# Patient Record
(demographics unavailable — no encounter records)

---

## 2024-11-07 NOTE — HISTORY OF PRESENT ILLNESS
[FreeTextEntry1] : 71 y/o male with Bladder stones and Recurrent UTI, here today for follow up with UTI. c/o dysuria, weak stream, Nocturia x1-2 denies hematuria and flank pain Hx of kidney stones years ago. Pending surgery. Eating citrus  FHx of PCA: denies Tobacco use: never smoke Last PSA: 4.30 ng/mL (10/03/24) Last creatinine: 1.36 mg/dL (10/03/24) Last UCx: POSITIVE (10/21/24) - treated with Ampicillin on 10/28/2024 Uro Meds: Tamsulosin 0.4 mg, Ampicillin  PSA Hx: 4.30 ng/mL (10/03/24) 4.81 ng/mL (06/07/24) - treated with Abx 7.03 ng/mL (02/21/24) 5.92 ng/mL (02/19/24)  03/20/2024 - CT Renal Stone hunt KIDNEYS/URETERS: Small right renal cysts. No right renal calculi or hydronephrosis. Numerous large left renal cortical and parapelvic cysts measuring up to 10.6 cm in the mid kidney. Nonobstructing 1.3 cm left lower pole renal and 3 mm mid renal calculi. No left ureteral calculi or hydronephrosis. BLADDER: There are 2 large bladder calculi measuring up to 1.3 cm. REPRODUCTIVE ORGANS: Prostate is not enlarged.  03/20/24 - MRI Prostate No MRI targetable lesions. PIRADS 2 - Low (clinically significant cancer is unlikely to be present) Inflammatory type enhancement in the posterior peripheral zone. Elevated PSA density; consider short interval follow-up MRI versus systematic biopsy. Prostate Volume: 30 mL PSA density: 0.24 ng/mL/mL  10/03/2024 - I discussed with the patient his condition and I explained that his weak urinary flow could be easily correlated to the bladder stones found on CT scan. I explained that the stones could be removed with an endoscopic laser treatment in OP setting. The patient agrees with the proposed strategy and is booked for surgery.

## 2025-01-13 NOTE — HISTORY OF PRESENT ILLNESS
[FreeTextEntry1] : 69 y/o male here today for follow up. Previously booked for URS c/o dysuria, weak stream, Nocturia x1-2 denies hematuria and flank pain Hx of kidney stones years ago Eating citrus  FHx of PCA: denies Tobacco use: never smoke Last PSA: 4.81 ng/mL (06/07/24) Last creatinine: 1.22 mg/dL (03/08/24) Last UCx: POSITIVE (03/07/24) - treated with Ampicillin Uro Meds: Tamsulosin 0.4 mg  PSA Hx: 4.81 ng/mL (06/07/24) - treated with Abx 7.03 ng/mL (02/21/24) 5.92 ng/mL (02/19/24)  03/20/2024 - CT Renal Stone hunt KIDNEYS/URETERS: Small right renal cysts. No right renal calculi or hydronephrosis. Numerous large left renal cortical and parapelvic cysts measuring up to 10.6 cm in the mid kidney. Nonobstructing 1.3 cm left lower pole renal and 3 mm mid renal calculi. No left ureteral calculi or hydronephrosis. BLADDER: There are 2 large bladder calculi measuring up to 1.3 cm. REPRODUCTIVE ORGANS: Prostate is not enlarged.  03/20/24 - MRI Prostate No MRI targetable lesions. PIRADS 2 - Low (clinically significant cancer is unlikely to be present) Inflammatory type enhancement in the posterior peripheral zone. Elevated PSA density; consider short interval follow-up MRI versus systematic biopsy. Prostate Volume: 30 mL PSA density: 0.24 ng/mL/mL

## 2025-01-14 NOTE — HISTORY OF PRESENT ILLNESS
[FreeTextEntry1] : 70-year-old man seen 01/14/2025 with complaint of Urinary Retention. Patient was recently seen at [facility] where CT showed Renal cysts. Largest cyst measures 9 cm on the left. An 8 mm left lower pole calculus. Moderate to severe bilateral hydronephrosis down to the bladder. Bladder calculi measuring up to 11 mm. Trabeculated bladder contour. A mann was placed for retention and patient was discharged on a course of cefuroxime and Flomax 0.4mg. Patient reports he has a history of renal stones and was previously seeing Dr. Villanueva. He was being treated for a persistent UTI related to incomplete bladder emptying. PSA (05/20/24) 4.30. Prostate MRI (03/20/24) showed PIRAIDS 2. Prostate volume 30ml. PSAD 0.24.

## 2025-01-14 NOTE — ASSESSMENT
[FreeTextEntry1] : 70-year-old male with BPH with Urinary retention resulting in bilateral hydronephrosis. Recommend increasing Flomax to 0.8mg. Discussed surgical interventions including TURP and Urolift. Patient will consider options. Discussed TOV today, but pt is afraid of retention so chooses to keep mann for now.  For Renal Cyst, discussed this is a benign finding. No intervention needed at this time.  For Renal Stone, 8mm left lower pole stone seen on CT. Discussed kidney stone prevention diet. Recommend increasing fluid intake to generate UO> 2L/day. For Bladder calculi, discussed bladder stone is most likely developed due to incomplete bladder emptying. Bladder stone likely contributing to the frequency of UTIs. Recommend bladder stone removal. For Elevated PSA, recent MRI PIRAIDS 2. Will continue to trend PSA.   Awaiting scheduling for bladder stone removal.

## 2025-01-30 NOTE — PHYSICAL EXAM
[Normal Appearance] : normal appearance [Well Groomed] : well groomed [General Appearance - In No Acute Distress] : no acute distress [] : no respiratory distress [Exaggerated Use Of Accessory Muscles For Inspiration] : no accessory muscle use [Respiration, Rhythm And Depth] : normal respiratory rhythm and effort [Abdomen Soft] : soft [Abdomen Tenderness] : non-tender [Urinary Bladder Findings] : the bladder was normal on palpation [Normal Station and Gait] : the gait and station were normal for the patient's age [No Focal Deficits] : no focal deficits [Oriented To Time, Place, And Person] : oriented to person, place, and time [Affect] : the affect was normal [Mood] : the mood was normal

## 2025-02-13 NOTE — ASSESSMENT
[FreeTextEntry1] : 70-year-old male with BPH with Urinary retention resulting in bilateral hydronephrosis s/p Bladder stone removal and passed trial of void. Taking Flomax 0.4 mg QHS but notes some frequency and feels could empty better.  mL. Will obtain UA/UCx.  Discussed increasing dose to 0.8 mg of Flomax and discussed NOGUEIRA procedure. Patient agrees with increasing the dose of Flomax for now. Will Rx Flomax 0.8 mg QHS. For Renal Stone, 8mm left lower pole stone seen on CT. Patient chooses to observe for now.  Recommend continue fluid intake to generate UO> 2L/day.  For Elevated PSA, MRI PIRAIDS 2. Last PSA 4.30 (10/03/24). Decreased from PSA 7.03 (02/21/24). Will repeat PSA in 6 weeks.    RTO in 6 weeks for symptom and PVR check or sooner PRN

## 2025-02-13 NOTE — HISTORY OF PRESENT ILLNESS
[FreeTextEntry1] : 70-year-old man seen 01/14/2025 with complaint of Urinary Retention. Patient was recently seen at [facility] where CT showed Renal cysts. Largest cyst measures 9 cm on the left. An 8 mm left lower pole calculus. Moderate to severe bilateral hydronephrosis down to the bladder. Bladder calculi measuring up to 11 mm. Trabeculated bladder contour. A mann was placed for retention and patient was discharged on a course of cefuroxime and Flomax 0.4mg. Patient reports he has a history of renal stones and was previously seeing Dr. Villanueva. He was being treated for a persistent UTI related to incomplete bladder emptying. PSA (05/20/24) 4.30. Prostate MRI (03/20/24) showed PIRAIDS 2. Prostate volume 30ml. PSAD 0.24.   01/30/25: Patient presents for follow up. s/p Litholapaxy, calculus, bladder, complex, using laser 24-Jan-2025. Patient currently has indwelling catheter. Currently Taking 1 Flomax. Creatinine 1.33/GFR 58 on 01/17/25. Will attempt TOV today.  Denies dysuria, hematuria, lower abdominal or flank pain, fever, chills or rigors.  02/13/2025: Patient presents for follow up. He reports after he passed trial of void he notes he has less urgency but does have frequency and wanted to make sure he did not have incomplete bladder emptying. He feels increasing his Tamsulosin dose might help his symptoms. No hematuria, no dysuria, no incontinence. No fevers, no chills, no nausea, no vomiting, no flank pain.  mL.

## 2025-02-13 NOTE — DISCUSSION/SUMMARY
[FreeTextEntry1] :  Hypertension: Satisfactory control: Continue losartan; check metabolic panel.  Shortness of breath: Resting ECG normal; return for echocardiogram and exercise ECG stress test.  Screening for lipid disorders: Check lipid panel.

## 2025-02-13 NOTE — PHYSICAL EXAM
[No Acute Distress] : no acute distress [Normal S1, S2] : normal S1, S2 [Clear Lung Fields] : clear lung fields [Good Air Entry] : good air entry [Soft] : abdomen soft [Non Tender] : non-tender [Normal Gait] : normal gait [Gait - Sufficient for Exercise Testing] : gait - sufficient for exercise testing [No Edema] : no edema [Moves all extremities] : moves all extremities [Alert and Oriented] : alert and oriented [de-identified] : Appears well [de-identified] : Pupils round [de-identified] : Normocephalic [de-identified] : No JVD [de-identified] : Warm, dry

## 2025-02-13 NOTE — HISTORY OF PRESENT ILLNESS
[FreeTextEntry1] : 70-year-old man seen 01/14/2025 with complaint of Urinary Retention. Patient was recently seen at [facility] where CT showed Renal cysts. Largest cyst measures 9 cm on the left. An 8 mm left lower pole calculus. Moderate to severe bilateral hydronephrosis down to the bladder. Bladder calculi measuring up to 11 mm. Trabeculated bladder contour. A mann was placed for retention and patient was discharged on a course of cefuroxime and Flomax 0.4mg. Patient reports he has a history of renal stones and was previously seeing Dr. Villanueva. He was being treated for a persistent UTI related to incomplete bladder emptying. PSA (05/20/24) 4.30. Prostate MRI (03/20/24) showed PIRAIDS 2. Prostate volume 30ml. PSAD 0.24.   01/30/25: Patient presents for follow up. s/p Litholapaxy, calculus, bladder, complex, using laser 24-Jan-2025. Patient currently has indwelling catheter. Currently Taking 1 Flomax. Creatinine 1.33/GFR 58 on 01/17/25. Will attempt TOV today.  Denies dysuria, hematuria, lower abdominal or flank pain, fever, chills or rigors.  02/13/2025: Patient presents for follow up. He reports after he passed trial of void he notes he has less urgency but does have frequency and wanted to make sure he did not have incomplete bladder emptying. He feels increasing his Tamsulosin dose might help his symptoms. No hematuria, no dysuria, no incontinence. No fevers, no chills, no nausea, no vomiting, no flank pain.  mL.

## 2025-02-13 NOTE — HISTORY OF PRESENT ILLNESS
[FreeTextEntry1] : Carlos Hong is a 70-year-old man with a history of hypertension, benign prostatic hypertrophy, bladder calculi with frequent UTI/urinary retention s/p recent cystoscopy who presents for cardiology consultation.  He has no history of heart disease and generally has been in good health --multiple, recent urologic problems and bladder infections.  He describes the recent onset of mild exertional dyspnea--strenuous activities provoke mild shortness of breath but no chest pain/pressure; dyspnea improves quickly with rest.  He reports well-controlled hypertension using losartan; says he "has no idea" about his cholesterol.  He reports a very healthy diet and daily exercise.